# Patient Record
Sex: FEMALE | URBAN - METROPOLITAN AREA
[De-identification: names, ages, dates, MRNs, and addresses within clinical notes are randomized per-mention and may not be internally consistent; named-entity substitution may affect disease eponyms.]

---

## 2023-07-06 ENCOUNTER — OFFICE VISIT (OUTPATIENT)
Dept: URBAN - METROPOLITAN AREA CLINIC 5 | Facility: CLINIC | Age: 23
End: 2023-07-06
Payer: COMMERCIAL

## 2023-07-06 DIAGNOSIS — H18.821 CORNEAL DISORDER DUE TO CONTACT LENS, RIGHT EYE: Primary | ICD-10-CM

## 2023-07-06 PROCEDURE — 92002 INTRM OPH EXAM NEW PATIENT: CPT | Performed by: OPTOMETRIST

## 2023-07-06 RX ORDER — NEOMYCIN SULFATE, POLYMYXIN B SULFATE AND DEXAMETHASONE 3.5; 10000; 1 MG/ML; [USP'U]/ML; MG/ML
SUSPENSION OPHTHALMIC
Qty: 5 | Refills: 0 | Status: ACTIVE
Start: 2023-07-06

## 2023-07-06 ASSESSMENT — INTRAOCULAR PRESSURE
OS: 18
OD: 19

## 2023-07-06 NOTE — IMPRESSION/PLAN
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 Plan: Start Maxitrol gtts QID OD and AT's 3-4 times daily. Discontinue contact lens use but save lenses and case if still available. RTC in 1 week for follow up visit.

## 2023-07-14 ENCOUNTER — OFFICE VISIT (OUTPATIENT)
Dept: URBAN - METROPOLITAN AREA CLINIC 6 | Facility: CLINIC | Age: 23
End: 2023-07-14
Payer: COMMERCIAL

## 2023-07-14 DIAGNOSIS — H18.821 CORNEAL DISORDER DUE TO CONTACT LENS, RIGHT EYE: Primary | ICD-10-CM

## 2023-07-14 PROCEDURE — 92012 INTRM OPH EXAM EST PATIENT: CPT | Performed by: OPTOMETRIST

## 2023-07-14 ASSESSMENT — INTRAOCULAR PRESSURE
OD: 23
OS: 15

## 2023-07-14 NOTE — IMPRESSION/PLAN
Impression: Corneal disorder due to contact lens, right eye: J88.214. Plan: Resolved- Stop Maxitrol gtts OD and continue AT's 3-4 times daily.